# Patient Record
Sex: MALE | URBAN - METROPOLITAN AREA
[De-identification: names, ages, dates, MRNs, and addresses within clinical notes are randomized per-mention and may not be internally consistent; named-entity substitution may affect disease eponyms.]

---

## 2020-11-13 ENCOUNTER — OFFICE VISIT (OUTPATIENT)
Dept: URBAN - METROPOLITAN AREA CLINIC 17 | Facility: CLINIC | Age: 75
End: 2020-11-13
Payer: MEDICARE

## 2020-11-13 ENCOUNTER — DASHBOARD ENCOUNTERS (OUTPATIENT)
Age: 75
End: 2020-11-13

## 2020-11-13 DIAGNOSIS — E55.9 VITAMIN D DEFICIENCY DISEASE: ICD-10-CM

## 2020-11-13 DIAGNOSIS — R63.4 WEIGHT LOSS: ICD-10-CM

## 2020-11-13 DIAGNOSIS — R54 ADVANCED AGE: ICD-10-CM

## 2020-11-13 DIAGNOSIS — R19.4 CHANGE IN BOWEL HABITS: ICD-10-CM

## 2020-11-13 DIAGNOSIS — Z12.11 SCREEN FOR COLON CANCER: ICD-10-CM

## 2020-11-13 DIAGNOSIS — K59.04 CHRONIC IDIOPATHIC CONSTIPATION: ICD-10-CM

## 2020-11-13 PROBLEM — 34713006: Status: ACTIVE | Noted: 2020-11-13

## 2020-11-13 PROBLEM — 89362005: Status: ACTIVE | Noted: 2020-11-13

## 2020-11-13 PROBLEM — 129851009: Status: ACTIVE | Noted: 2020-11-13

## 2020-11-13 PROBLEM — 82934008: Status: ACTIVE | Noted: 2020-11-13

## 2020-11-13 PROBLEM — 49808004: Status: ACTIVE | Noted: 2020-11-13

## 2020-11-13 PROCEDURE — 3017F COLORECTAL CA SCREEN DOC REV: CPT | Performed by: INTERNAL MEDICINE

## 2020-11-13 PROCEDURE — 1036F TOBACCO NON-USER: CPT | Performed by: INTERNAL MEDICINE

## 2020-11-13 PROCEDURE — G8482 FLU IMMUNIZE ORDER/ADMIN: HCPCS | Performed by: INTERNAL MEDICINE

## 2020-11-13 PROCEDURE — 99204 OFFICE O/P NEW MOD 45 MIN: CPT | Performed by: INTERNAL MEDICINE

## 2020-11-13 PROCEDURE — G8420 CALC BMI NORM PARAMETERS: HCPCS | Performed by: INTERNAL MEDICINE

## 2020-11-13 RX ORDER — LISINOPRIL 10 MG/1
AS DIRECTED TABLET ORAL
Status: ACTIVE | COMMUNITY

## 2020-11-13 RX ORDER — TAMSULOSIN HYDROCHLORIDE 0.4 MG/1
1 CAPSULE CAPSULE ORAL
Status: ACTIVE | COMMUNITY

## 2020-11-13 RX ORDER — ZIPRASIDONE HYDROCHLORIDE 80 MG/1
1 CAPSULE WITH FOOD CAPSULE ORAL
Status: ACTIVE | COMMUNITY

## 2020-11-13 RX ORDER — OLANZAPINE 2.5 MG/1
AS DIRECTED TABLET, FILM COATED ORAL
Status: ACTIVE | COMMUNITY

## 2020-11-13 NOTE — PHYSICAL EXAM GASTROINTESTINAL
Abdomen , soft, nontender, nondistended , no guarding or rigidity , no masses palpable , normal bowel sounds , Liver and Spleen , no hepatomegaly present , no hepatosplenomegaly , liver nontender , spleen not palpable. Thin scaphoid abdomen

## 2020-11-13 NOTE — HPI-OTHER HISTORIES
The pt with history of hypertension, and BPH who presents for a screening colonoscopy. The pt notes that he has struggled with constipation with intermittent rectal bleeding. The notes that he is eating a high fiber, low fat diet.

## 2021-02-16 ENCOUNTER — OFFICE VISIT (OUTPATIENT)
Dept: URBAN - METROPOLITAN AREA SURGERY CENTER 16 | Facility: SURGERY CENTER | Age: 76
End: 2021-02-16

## 2021-03-16 PROBLEM — 305058001: Status: ACTIVE | Noted: 2020-11-13

## 2021-04-27 ENCOUNTER — OFFICE VISIT (OUTPATIENT)
Dept: URBAN - METROPOLITAN AREA SURGERY CENTER 16 | Facility: SURGERY CENTER | Age: 76
End: 2021-04-27